# Patient Record
Sex: FEMALE | Race: WHITE | NOT HISPANIC OR LATINO | Employment: UNEMPLOYED | URBAN - METROPOLITAN AREA
[De-identification: names, ages, dates, MRNs, and addresses within clinical notes are randomized per-mention and may not be internally consistent; named-entity substitution may affect disease eponyms.]

---

## 2022-07-12 PROCEDURE — 99283 EMERGENCY DEPT VISIT LOW MDM: CPT

## 2022-07-13 ENCOUNTER — HOSPITAL ENCOUNTER (EMERGENCY)
Facility: CLINIC | Age: 32
Discharge: HOME | End: 2022-07-13
Attending: EMERGENCY MEDICINE | Admitting: EMERGENCY MEDICINE
Payer: COMMERCIAL

## 2022-07-13 VITALS
TEMPERATURE: 98 F | SYSTOLIC BLOOD PRESSURE: 128 MMHG | HEART RATE: 82 BPM | DIASTOLIC BLOOD PRESSURE: 78 MMHG | RESPIRATION RATE: 16 BRPM | OXYGEN SATURATION: 98 %

## 2022-07-13 DIAGNOSIS — T15.92XA FOREIGN BODY OF LEFT EYE, INITIAL ENCOUNTER: ICD-10-CM

## 2022-07-13 PROCEDURE — 250N000009 HC RX 250

## 2022-07-13 PROCEDURE — 250N000009 HC RX 250: Performed by: EMERGENCY MEDICINE

## 2022-07-13 RX ORDER — ERYTHROMYCIN 5 MG/G
0.5 OINTMENT OPHTHALMIC 4 TIMES DAILY
Qty: 1 G | Refills: 0 | Status: SHIPPED | OUTPATIENT
Start: 2022-07-13

## 2022-07-13 RX ORDER — PROPARACAINE HYDROCHLORIDE 5 MG/ML
1 SOLUTION/ DROPS OPHTHALMIC ONCE
Status: COMPLETED | OUTPATIENT
Start: 2022-07-13 | End: 2022-07-13

## 2022-07-13 RX ADMIN — FLUORESCEIN SODIUM 600 MCG: 0.6 STRIP OPHTHALMIC at 01:07

## 2022-07-13 RX ADMIN — PROPARACAINE HYDROCHLORIDE 1 DROP: 5 SOLUTION/ DROPS OPHTHALMIC at 01:07

## 2022-07-13 ASSESSMENT — ENCOUNTER SYMPTOMS
EYE PAIN: 1
EYE REDNESS: 1
EYE DISCHARGE: 1

## 2022-07-13 NOTE — ED TRIAGE NOTES
Pt had a tiny sliver of wood fly into her eye yesterday. Pt was able to remove one sliver but feels like another piece is still in her left eye.     Triage Assessment     Row Name 07/13/22 0037       Triage Assessment (Adult)    Airway WDL WDL       Respiratory WDL    Respiratory WDL WDL       Skin Circulation/Temperature WDL    Skin Circulation/Temperature WDL WDL       Cardiac WDL    Cardiac WDL WDL       Peripheral/Neurovascular WDL    Peripheral Neurovascular WDL WDL       Cognitive/Neuro/Behavioral WDL    Cognitive/Neuro/Behavioral WDL WDL

## 2022-07-13 NOTE — ED PROVIDER NOTES
History   Chief Complaint:  Eye Injury     The history is provided by the patient.      Clarissa Feng is a 31 year old female who presents with eye injury. The patient tells us that yesterday evening she was putting some of her kids toys above a door frame when a wood sliver fell from the door frame into her left eye. Immediately after she was able to remove one sliver by rubbing her eye and washing it with a syringe. However, she still feels that there is another wood sliver in the left eye. This morning when the patient woke up her eye was closed shut and covered in puss. At baseline she tells us that she has great vision, however currently the vision in her left eye is impaired.     Review of Systems   Eyes: Positive for pain, discharge, redness and visual disturbance.   All other systems reviewed and are negative.    Allergies:  Codeine  Penicillins     Medications:  Pepcid  Effexor  Monodox    Past Medical History:       Anxiety   Anemia in pregnancy   Obstetric vaginal laceration     Past Surgical History:    The patient denies past surgical history.      Family History:    The patient denies past family history.     Social History:  The patient presents to the ED with her      Physical Exam     Patient Vitals for the past 24 hrs:   BP Temp Temp src Pulse Resp SpO2   22 0034 128/78 98  F (36.7  C) Temporal 82 16 98 %     Physical Exam    GENERAL: well developed, pleasant  HEAD: atraumatic  EYES: Tearful in the left eye with redness, conjuntal injection and discharge noted   ENT:  mucus membranes moist  NECK:  trachea midline, normal range of motion  RESPIRATORY: no tachypnea, breath sounds clear to auscultation   CVS: normal S1/S2, no murmurs, intact distal pulses  ABDOMEN: soft, nontender, nondistention  MUSCULOSKELETAL: no deformities  SKIN: warm and dry, no acute rashes or ulceration  NEURO: GCS 15, cranial nerves intact, alert and oriented x3  PSYCH:  Mood/affect normal    Emergency  Department Course     Procedures    Slit Lamp Exam:    Lids WNL  No foreign body noted in detailed exam upper and lower lids/margins  PERRLA, EOMI.  Anterior Chamber: No cells or flare, No hyphema. No hypopyon.   Cornea: No foreign body. Fluorescein staining: tiny abrasion at 12 o'clock.    Emergency Department Course:    Reviewed:  I reviewed nursing notes, vitals, past medical history, Care Everywhere and [facility]    Assessments:  0049 I obtained history and examined the patient as noted above.   0120 I rechecked the patient and explained findings.     Interventions:  0107 Fluorescein 600 mcg   0107 Proparacaine 0.5% 1 drop     Disposition:  The patient was discharged to home.     Impression & Plan     Medical Decision Making:  Patient presents with piece of wood falling into her left eye with removing part of it yesterday and now increased drainage, and pain in the upper eye lid.  Detailed eye exam and everting the lids, do not see a foreign body or significant corneal abrasion. A Q tip was run along the upper eye lid twice to hopefully remove an foreign body not seen by exam.  Discussed possibility of retained foreign body or imbedded foreign  Body in the upper eyelid.  Will cover with antibiotic eye drops and follow up with opthalmology.      Diagnosis:    ICD-10-CM    1. Foreign body of left eye, initial encounter  T15.92XA      Discharge Medications:  New Prescriptions    ERYTHROMYCIN (ROMYCIN) 5 MG/GM OPHTHALMIC OINTMENT    Place 0.5 inches Into the left eye 4 times daily     Scribe Disclosure:  James HOANG, am serving as a scribe at 12:50 AM on 7/13/2022 to document services personally performed by Ramón Persaud MD, based on my observations and the provider's statements to me.          Ramón Persaud MD  07/13/22 0312

## 2022-07-14 ENCOUNTER — HOSPITAL ENCOUNTER (EMERGENCY)
Facility: CLINIC | Age: 32
Discharge: HOME | End: 2022-07-14
Attending: EMERGENCY MEDICINE | Admitting: EMERGENCY MEDICINE
Payer: COMMERCIAL

## 2022-07-14 VITALS
HEIGHT: 66 IN | RESPIRATION RATE: 18 BRPM | HEART RATE: 78 BPM | SYSTOLIC BLOOD PRESSURE: 125 MMHG | BODY MASS INDEX: 22.5 KG/M2 | WEIGHT: 140 LBS | DIASTOLIC BLOOD PRESSURE: 85 MMHG | TEMPERATURE: 98.4 F | OXYGEN SATURATION: 98 %

## 2022-07-14 DIAGNOSIS — J02.9 ACUTE PHARYNGITIS, UNSPECIFIED ETIOLOGY: ICD-10-CM

## 2022-07-14 LAB — HCG UR QL: NEGATIVE

## 2022-07-14 PROCEDURE — 250N000009 HC RX 250: Performed by: EMERGENCY MEDICINE

## 2022-07-14 PROCEDURE — 81025 URINE PREGNANCY TEST: CPT | Performed by: EMERGENCY MEDICINE

## 2022-07-14 PROCEDURE — 99283 EMERGENCY DEPT VISIT LOW MDM: CPT

## 2022-07-14 PROCEDURE — 250N000011 HC RX IP 250 OP 636: Performed by: EMERGENCY MEDICINE

## 2022-07-14 RX ORDER — DIPHENHYDRAMINE HYDROCHLORIDE AND LIDOCAINE HYDROCHLORIDE AND ALUMINUM HYDROXIDE AND MAGNESIUM HYDRO
5-10 KIT EVERY 6 HOURS PRN
Qty: 119 ML | Refills: 0 | Status: SHIPPED | OUTPATIENT
Start: 2022-07-14

## 2022-07-14 RX ADMIN — ORAL VEHICLES - SUSP 10 MG: SUSPENSION at 00:58

## 2022-07-14 ASSESSMENT — ENCOUNTER SYMPTOMS
SORE THROAT: 1
SHORTNESS OF BREATH: 1

## 2022-07-14 NOTE — ED PROVIDER NOTES
"  History   Chief Complaint:  Pharyngitis        HPI   Clarissa Feng is a 31 year old female who presents with pharyngitis. The patient reports that she has been having sore throat with shortness of breath for the past 5 days. She reports that she visited her primary care and tested for everything which came out to be negative. She also states taking tylenol and ibuprofen for the past 5 days with the last dose taken an hour ago prior to her arrival at the ED. She also reports being concerned for pregnancy and that she is breast feeding. She denies having fever but does indicate cough, congestion, pressure near her ear, anxiety.    Review of Systems   HENT: Positive for congestion and sore throat.    Respiratory: Positive for cough and shortness of breath.    Cardiovascular: Negative for chest pain.   Gastrointestinal: Negative for abdominal pain and vomiting.   All other systems reviewed and are negative.      Allergies:  The patient has no known allergies.     Medications:  Effexor  Aristocort  Monodox  Tylenol  Ibuprofen   Past Medical History:       Female stress incontinence  Anxiety     Past Surgical History:    No past surgical history on file.     Family History:    No family history on file.    Social History:  The patient presents to the ED alone.        Physical Exam     Patient Vitals for the past 24 hrs:   BP Temp Temp src Pulse Resp SpO2 Height Weight   22 0008 125/85 98.4  F (36.9  C) Temporal 78 18 98 % 1.676 m (5' 6\") 63.5 kg (140 lb)       Physical Exam  General: Appears well-developed and well-nourished.   Head: No signs of trauma.   Mouth/Throat: Oropharynx is clear and moist.   Eyes: Conjunctivae are normal.   Neck: Normal range of motion. No nuchal rigidity. No cervical adenopathy  CV: Normal rate and regular rhythm.    Resp: Effort normal and breath sounds normal. No respiratory distress.   GI: Soft. There is no tenderness.  No rebound or guarding.  Normal bowel sounds.    MSK: " Normal range of motion.   Neuro: The patient is alert and oriented. Quite speech  Skin: Skin is warm and dry. No rash noted.   Psych: normal mood and affect. behavior is normal.       Emergency Department Course     Laboratory:  Labs Ordered and Resulted from Time of ED Arrival to Time of ED Departure   HCG QUALITATIVE URINE - Normal       Result Value    hCG Urine Qualitative Negative        Emergency Department Course:           Reviewed:  I reviewed nursing notes, vitals, past medical history and Care Everywhere    Assessments:  0009 I obtained history and examined the patient as noted above.    I rechecked the patient and explained findings.     Interventions:  0058 Decadron 10 mg oral    Disposition:  The patient was discharged to home.     Impression & Plan     Medical Decision Making:  Pt presents with a sore throat.  She has been dealing with a sore throat, cough, and congestion for a few days.  She had gone to her clinic yesterday and reports a negative strep and COVID test.  She had continued symptoms and came to the ER.  On my evaluation the pt did have a quite speech. No significant posterior oral swelling noted.  Pt able to drink liquids.  I discussed option to repeat testing, but given it was just yesterday and pt had symptoms for a number of days prior, likely low utility to repeating tests and pt agreed.  Given her question of possible pregnancy I did obtain a pregnancy test, which was negative.  Pt was given decadron to help with her pharyngitis.  She had taken tylenol and ibuprofen prior to arrival.  I discussed medications with the pharmacist given pt is breast feeding an 11 month old.  I discussed swish and spit with magic mouthwash to help further with symptom.  I did not feel that imaging or further testing was necessary for deep space infection.  Pt was discharged home.      Diagnosis:    ICD-10-CM    1. Acute streptococcal pharyngitis  J02.0        Discharge Medications:  Discharge Medication  List as of 7/14/2022 12:58 AM      START taking these medications    Details   magic mouthwash suspension, diphenhydrAMINE, lidocaine, aluminum-magnesium & simethicone, (FIRST-MOUTHWASH BLM) compounding kit Swish and spit 5-10 mLs in mouth every 6 hours as needed for sore throat, Disp-119 mL, R-0, E-Prescribe             Scribe Disclosure:  I, CHRISTINE NAQVI, am serving as a scribe at 12:09 AM on 7/14/2022 to document services personally performed by Galen Urias MD, based on my observations and the provider's statements to me.              Galen Urias MD  07/15/22 0423

## 2022-07-14 NOTE — ED TRIAGE NOTES
"Pt c/o of sore throat, difficulty swallowing, shortness of breath. Pt states sore throat has been going on for the last 5 days, was seen at the primary care and tested for \"everything\" which was negative. Pt states she is unable to swallow anything tonight.       "

## 2022-07-15 ASSESSMENT — ENCOUNTER SYMPTOMS
COUGH: 1
VOMITING: 0
ABDOMINAL PAIN: 0

## 2025-01-17 ENCOUNTER — APPOINTMENT (OUTPATIENT)
Dept: GENERAL RADIOLOGY | Facility: CLINIC | Age: 35
End: 2025-01-17
Attending: EMERGENCY MEDICINE
Payer: COMMERCIAL

## 2025-01-17 ENCOUNTER — HOSPITAL ENCOUNTER (EMERGENCY)
Facility: CLINIC | Age: 35
Discharge: HOME | End: 2025-01-17
Attending: EMERGENCY MEDICINE | Admitting: EMERGENCY MEDICINE
Payer: COMMERCIAL

## 2025-01-17 VITALS
SYSTOLIC BLOOD PRESSURE: 117 MMHG | HEART RATE: 74 BPM | TEMPERATURE: 98.1 F | HEIGHT: 66 IN | OXYGEN SATURATION: 99 % | DIASTOLIC BLOOD PRESSURE: 70 MMHG | BODY MASS INDEX: 24.11 KG/M2 | RESPIRATION RATE: 16 BRPM | WEIGHT: 150 LBS

## 2025-01-17 DIAGNOSIS — J10.1 INFLUENZA A: ICD-10-CM

## 2025-01-17 LAB
ANION GAP SERPL CALCULATED.3IONS-SCNC: 12 MMOL/L (ref 7–15)
ATRIAL RATE - MUSE: 72 BPM
BASOPHILS # BLD AUTO: 0.1 10E3/UL (ref 0–0.2)
BASOPHILS NFR BLD AUTO: 1 %
BUN SERPL-MCNC: 10.3 MG/DL (ref 6–20)
CALCIUM SERPL-MCNC: 9.3 MG/DL (ref 8.8–10.4)
CHLORIDE SERPL-SCNC: 106 MMOL/L (ref 98–107)
CREAT SERPL-MCNC: 0.63 MG/DL (ref 0.51–0.95)
DIASTOLIC BLOOD PRESSURE - MUSE: NORMAL MMHG
EGFRCR SERPLBLD CKD-EPI 2021: >90 ML/MIN/1.73M2
EOSINOPHIL # BLD AUTO: 0.2 10E3/UL (ref 0–0.7)
EOSINOPHIL NFR BLD AUTO: 2 %
ERYTHROCYTE [DISTWIDTH] IN BLOOD BY AUTOMATED COUNT: 12.1 % (ref 10–15)
FLUAV RNA SPEC QL NAA+PROBE: POSITIVE
FLUBV RNA RESP QL NAA+PROBE: NEGATIVE
GLUCOSE SERPL-MCNC: 79 MG/DL (ref 70–99)
HCO3 SERPL-SCNC: 25 MMOL/L (ref 22–29)
HCT VFR BLD AUTO: 38.4 % (ref 35–47)
HGB BLD-MCNC: 13.3 G/DL (ref 11.7–15.7)
HOLD SPECIMEN: NORMAL
HOLD SPECIMEN: NORMAL
IMM GRANULOCYTES # BLD: 0 10E3/UL
IMM GRANULOCYTES NFR BLD: 0 %
INTERPRETATION ECG - MUSE: NORMAL
LYMPHOCYTES # BLD AUTO: 1.2 10E3/UL (ref 0.8–5.3)
LYMPHOCYTES NFR BLD AUTO: 18 %
MCH RBC QN AUTO: 30.9 PG (ref 26.5–33)
MCHC RBC AUTO-ENTMCNC: 34.6 G/DL (ref 31.5–36.5)
MCV RBC AUTO: 89 FL (ref 78–100)
MONOCYTES # BLD AUTO: 0.6 10E3/UL (ref 0–1.3)
MONOCYTES NFR BLD AUTO: 8 %
NEUTROPHILS # BLD AUTO: 5 10E3/UL (ref 1.6–8.3)
NEUTROPHILS NFR BLD AUTO: 71 %
NRBC # BLD AUTO: 0 10E3/UL
NRBC BLD AUTO-RTO: 0 /100
P AXIS - MUSE: 77 DEGREES
PLATELET # BLD AUTO: 317 10E3/UL (ref 150–450)
POTASSIUM SERPL-SCNC: 3.6 MMOL/L (ref 3.4–5.3)
PR INTERVAL - MUSE: 164 MS
QRS DURATION - MUSE: 80 MS
QT - MUSE: 398 MS
QTC - MUSE: 435 MS
R AXIS - MUSE: 70 DEGREES
RBC # BLD AUTO: 4.3 10E6/UL (ref 3.8–5.2)
RSV RNA SPEC NAA+PROBE: NEGATIVE
SARS-COV-2 RNA RESP QL NAA+PROBE: NEGATIVE
SODIUM SERPL-SCNC: 143 MMOL/L (ref 135–145)
SYSTOLIC BLOOD PRESSURE - MUSE: NORMAL MMHG
T AXIS - MUSE: 63 DEGREES
TROPONIN T SERPL HS-MCNC: <6 NG/L
VENTRICULAR RATE- MUSE: 72 BPM
WBC # BLD AUTO: 7.1 10E3/UL (ref 4–11)

## 2025-01-17 PROCEDURE — 87637 SARSCOV2&INF A&B&RSV AMP PRB: CPT | Performed by: EMERGENCY MEDICINE

## 2025-01-17 PROCEDURE — 84484 ASSAY OF TROPONIN QUANT: CPT | Performed by: EMERGENCY MEDICINE

## 2025-01-17 PROCEDURE — 85025 COMPLETE CBC W/AUTO DIFF WBC: CPT | Performed by: EMERGENCY MEDICINE

## 2025-01-17 PROCEDURE — 99285 EMERGENCY DEPT VISIT HI MDM: CPT | Mod: 25

## 2025-01-17 PROCEDURE — 82565 ASSAY OF CREATININE: CPT | Performed by: EMERGENCY MEDICINE

## 2025-01-17 PROCEDURE — 80048 BASIC METABOLIC PNL TOTAL CA: CPT | Performed by: EMERGENCY MEDICINE

## 2025-01-17 PROCEDURE — 93005 ELECTROCARDIOGRAM TRACING: CPT

## 2025-01-17 PROCEDURE — 36415 COLL VENOUS BLD VENIPUNCTURE: CPT | Performed by: EMERGENCY MEDICINE

## 2025-01-17 PROCEDURE — 85041 AUTOMATED RBC COUNT: CPT | Performed by: EMERGENCY MEDICINE

## 2025-01-17 PROCEDURE — 85004 AUTOMATED DIFF WBC COUNT: CPT | Performed by: EMERGENCY MEDICINE

## 2025-01-17 PROCEDURE — 71046 X-RAY EXAM CHEST 2 VIEWS: CPT

## 2025-01-17 ASSESSMENT — COLUMBIA-SUICIDE SEVERITY RATING SCALE - C-SSRS
6. HAVE YOU EVER DONE ANYTHING, STARTED TO DO ANYTHING, OR PREPARED TO DO ANYTHING TO END YOUR LIFE?: NO
2. HAVE YOU ACTUALLY HAD ANY THOUGHTS OF KILLING YOURSELF IN THE PAST MONTH?: NO
1. IN THE PAST MONTH, HAVE YOU WISHED YOU WERE DEAD OR WISHED YOU COULD GO TO SLEEP AND NOT WAKE UP?: NO

## 2025-01-17 ASSESSMENT — ACTIVITIES OF DAILY LIVING (ADL)
ADLS_ACUITY_SCORE: 41
ADLS_ACUITY_SCORE: 41

## 2025-01-17 NOTE — Clinical Note
Addended by: CARLOS PAYNE on: 1/17/2020 09:24 AM     Modules accepted: Orders     Clarissa Feng was seen and treated in our emergency department on 1/17/2025.    Clarissa Feng was in the Emergency Room 1/17/25 and is unable to fly for 5 days.     Sincerely,     [facility] Emergency Dept

## 2025-01-18 NOTE — ED PROVIDER NOTES
"  Emergency Department Note      History of Present Illness     Chief Complaint   Cough and shortness of breath     HPI   Clarissa Feng is a 34 year old female with a history of anxiety, presenting to the emergency department for evaluation of cough and shortness of breath. The patient reports experiencing shortness of breath, chest pressure, headache, and cough since last night. She states her cough was initially dry, but became productive at approximately 1400 today. She also indicates noticing a tickle in her throat beginning last night. She denies any fevers or sore throat. She denies any recent sick contacts, but notes having 1, 3, and 5 year kids at home . She was seen today at urgent care for her shortness of breath, and told to come into the emergency department. She is influenza A positive.      Independent Historian   None    Review of External Notes   none    Past Medical History     Medical History and Problem List   Anxiety     Medications   Effexor XR      Surgical History   None     Physical Exam     Patient Vitals for the past 24 hrs:   BP Temp Temp src Pulse Resp SpO2 Height Weight   01/17/25 1815 117/70 -- -- -- -- -- -- --   01/17/25 1814 -- -- -- 74 -- 99 % -- --   01/17/25 1812 -- 98.1  F (36.7  C) Oral -- 16 -- 1.676 m (5' 6\") 68 kg (150 lb)     Physical Exam    Physical Exam   Constitutional:  Patient is oriented to person, place, and time. They appear well-developed and well-nourished.     HENT:   Mouth/Throat:   Oropharynx is clear and moist.   Eyes:    Conjunctivae normal and EOM are normal. Pupils are equal, round, and reactive to light.   Neck:    Normal range of motion.   Cardiovascular: Normal rate, regular rhythm and normal heart sounds.  Exam reveals no gallop and no friction rub.  No murmur heard.  Pulmonary/Chest:  Effort normal and breath sounds normal. Patient has no wheezes. Patient has no rales.   Neurological:   Patient is alert and oriented to person, place, and time. " Patient has normal strength. No cranial nerve deficit or sensory deficit. GCS 15.  Skin:   Skin is warm and dry. No rash noted. No erythema.   Psychiatric:   Patient has a normal mood and affect. Patient's behavior is normal. Judgment and thought content normal.       Diagnostics     Lab Results   Labs Ordered and Resulted from Time of ED Arrival to Time of ED Departure   INFLUENZA A/B, RSV AND SARS-COV2 PCR - Abnormal       Result Value    Influenza A PCR Positive (*)     Influenza B PCR Negative      RSV PCR Negative      SARS CoV2 PCR Negative     BASIC METABOLIC PANEL - Normal    Sodium 143      Potassium 3.6      Chloride 106      Carbon Dioxide (CO2) 25      Anion Gap 12      Urea Nitrogen 10.3      Creatinine 0.63      GFR Estimate >90      Calcium 9.3      Glucose 79     TROPONIN T, HIGH SENSITIVITY - Normal    Troponin T, High Sensitivity <6     CBC WITH PLATELETS AND DIFFERENTIAL    WBC Count 7.1      RBC Count 4.30      Hemoglobin 13.3      Hematocrit 38.4      MCV 89      MCH 30.9      MCHC 34.6      RDW 12.1      Platelet Count 317      % Neutrophils 71      % Lymphocytes 18      % Monocytes 8      % Eosinophils 2      % Basophils 1      % Immature Granulocytes 0      NRBCs per 100 WBC 0      Absolute Neutrophils 5.0      Absolute Lymphocytes 1.2      Absolute Monocytes 0.6      Absolute Eosinophils 0.2      Absolute Basophils 0.1      Absolute Immature Granulocytes 0.0      Absolute NRBCs 0.0         Imaging   XR Chest 2 Views   Final Result   IMPRESSION: Heart size and pulmonary vascularity are normal. No focal consolidation, pleural effusion, or pneumothorax.          EKG   ECG results from 01/17/25   EKG 12-lead, tracing only     Value    Systolic Blood Pressure     Diastolic Blood Pressure     Ventricular Rate 72    Atrial Rate 72    KS Interval 164    QRS Duration 80        QTc 435    P Axis 77    R AXIS 70    T Axis 63    Interpretation ECG      Sinus rhythm  Normal ECG  No previous ECGs  available  Read at 2019 by Malena Trinh MD           Independent Interpretation   I independently interpreted the patient's chest X-Ray, noting pneumothorax.     ED Course      Medications Administered   Medications - No data to display    Procedures   Procedures     Discussion of Management   None    ED Course   ED Course as of 01/17/25 2204 Fri Jan 17, 2025 1930 I obtained history and examined the patient as noted above.        Additional Documentation  None    Medical Decision Making / Diagnosis     CMS Diagnoses: None    MIPS       None    MDM   Clarissa Feng is a 34 year old female who presents for evaluation of cough and shortness of breath.  The patient denies any recent fevers or sore throat.   This is consistent with influenza.  EKG shows a sinus rhythm no evidence of right heart strain.  Blood work shows normal white blood cell count metabolic panel.  Chest x-ray shows no evidence of infiltrate.  The patient is within the treatment window for influenza and medications, I discussed the risks and benefits of Tamiflu, does not wish to take Tamiflu.   Close followup of primary care physician is indicated and return to the ED for high fevers > 103 for more than 48 hours more, increasing productive cough, shortness of breath, or confusion.  There is no signs of serious bacterial infection such as bacteremia, meningitis, UTI/pyelonephritis, strep pharyngitis, etc.        Disposition   The patient was discharged.     Diagnosis     ICD-10-CM    1. Influenza A  J10.1            Discharge Medications   Discharge Medication List as of 1/17/2025  8:21 PM            Scribe Disclosure:  I, George Lee, am serving as a scribe at 8:10 PM on 1/17/2025 to document services personally performed by Malena Trinh MD based on my observations and the provider's statements to me.        Malena Trinh MD  01/17/25 2205

## 2025-01-19 ENCOUNTER — HEALTH MAINTENANCE LETTER (OUTPATIENT)
Age: 35
End: 2025-01-19